# Patient Record
Sex: FEMALE | NOT HISPANIC OR LATINO
[De-identification: names, ages, dates, MRNs, and addresses within clinical notes are randomized per-mention and may not be internally consistent; named-entity substitution may affect disease eponyms.]

---
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
## 2018-04-04 ENCOUNTER — HOSPITAL ENCOUNTER (OUTPATIENT)
Age: 18
Discharge: HOME | End: 2018-04-04
Attending: FAMILY MEDICINE
Payer: MEDICAID

## 2018-04-04 VITALS
BODY MASS INDEX: 21.71 KG/M2 | SYSTOLIC BLOOD PRESSURE: 106 MMHG | OXYGEN SATURATION: 97 % | TEMPERATURE: 97 F | RESPIRATION RATE: 16 BRPM | DIASTOLIC BLOOD PRESSURE: 62 MMHG | HEIGHT: 61 IN | HEART RATE: 76 BPM | WEIGHT: 115 LBS

## 2018-04-04 DIAGNOSIS — L03.211 FACIAL CELLULITIS: Primary | ICD-10-CM

## 2018-04-04 PROCEDURE — 99213 OFFICE O/P EST LOW 20 MIN: CPT

## 2018-04-04 PROCEDURE — 99204 OFFICE O/P NEW MOD 45 MIN: CPT

## 2018-04-04 RX ORDER — CEFDINIR 300 MG/1
300 CAPSULE ORAL 2 TIMES DAILY
Qty: 20 CAPSULE | Refills: 0 | Status: SHIPPED | OUTPATIENT
Start: 2018-04-04 | End: 2018-04-14

## 2018-04-04 NOTE — ED INITIAL ASSESSMENT (HPI)
Pt with c/o throat pain, left ear pain and left facial pain. Pt c/o cough and congestion. Pt currently breastfeeding.   States face felt swollen this morning

## 2018-04-05 NOTE — ED PROVIDER NOTES
Patient Seen in: [id number] [facility]    History   Patient presents with:  Cough/URI    Stated Complaint: sore throat/sinus problems    HPI    58-year-old female presents to the immediate care today with chief complaints of pain and swelling o 106/62   Pulse 76   Temp (!) 97.3 °F (36.3 °C) (Temporal)   Resp 16   Ht 154.9 cm (5' 1\")   Wt 52.2 kg   SpO2 97%   BMI 21.73 kg/m²         Physical Exam    General appearance: alert, appears stated age and cooperative  Head: Normocephalic, without obviou to display    ED Course as of Apr 04 2104  ------------------------------------------------------------       MDM     20-year-old female presenting with mild swelling, redness and pain in the left side of the face.   Possibility of facial cellulitis not rul

## 2018-04-08 ENCOUNTER — HOSPITAL ENCOUNTER (OUTPATIENT)
Age: 18
Discharge: HOME | End: 2018-04-08
Payer: MEDICAID

## 2018-04-08 VITALS
SYSTOLIC BLOOD PRESSURE: 127 MMHG | HEART RATE: 82 BPM | OXYGEN SATURATION: 99 % | DIASTOLIC BLOOD PRESSURE: 78 MMHG | RESPIRATION RATE: 19 BRPM | TEMPERATURE: 97 F

## 2018-04-08 DIAGNOSIS — J04.0 LARYNGITIS: Primary | ICD-10-CM

## 2018-04-08 PROCEDURE — 87081 CULTURE SCREEN ONLY: CPT | Performed by: PHYSICIAN ASSISTANT

## 2018-04-08 PROCEDURE — 99214 OFFICE O/P EST MOD 30 MIN: CPT

## 2018-04-08 PROCEDURE — 87430 STREP A AG IA: CPT | Performed by: PHYSICIAN ASSISTANT

## 2018-04-08 PROCEDURE — 99213 OFFICE O/P EST LOW 20 MIN: CPT

## 2018-04-08 NOTE — ED PROVIDER NOTES
Patient Seen in: [address] [facility]    History   Patient presents with:  Sore Throat    Stated Complaint: LOSS OF VOICE/SORE THROAT    HPI    CHIEF COMPLAINT: Hoarse voice and sore throat     HISTORY OF PRESENT ILLNESS: Patient is a 17-year- Used                          Review of Systems    Positive for stated complaint: LOSS OF VOICE/SORE THROAT  Other systems are as noted in HPI. Constitutional and vital signs reviewed. All other systems reviewed and negative except as noted above. can return for reevaluation. Patient voiced understanding to the treatment plan. All questions answered.           Disposition and Plan     Clinical Impression:  Laryngitis  (primary encounter diagnosis)    Disposition:  Discharge  4/8/2018  5:16 pm    Fo

## 2018-04-08 NOTE — ED INITIAL ASSESSMENT (HPI)
Sore throat, loss of voice. Seen 3 days ago here, seen 2 days ago at another facility. Diagnosed with allergies. Today states she is still not feeling any better. Pt is currently breastfeeding.

## 2019-01-24 ENCOUNTER — IMAGING SERVICES (OUTPATIENT)
Dept: OTHER | Age: 19
End: 2019-01-24

## 2023-09-12 ENCOUNTER — TELEPHONE (OUTPATIENT)
Dept: OTHER | Age: 23
End: 2023-09-12